# Patient Record
Sex: FEMALE | Race: BLACK OR AFRICAN AMERICAN | NOT HISPANIC OR LATINO | URBAN - METROPOLITAN AREA
[De-identification: names, ages, dates, MRNs, and addresses within clinical notes are randomized per-mention and may not be internally consistent; named-entity substitution may affect disease eponyms.]

---

## 2020-09-15 ENCOUNTER — OFFICE VISIT (OUTPATIENT)
Dept: URBAN - METROPOLITAN AREA CLINIC 13 | Facility: CLINIC | Age: 50
End: 2020-09-15

## 2020-09-15 PROBLEM — 38341003 HYPERTENSION: Status: ACTIVE | Noted: 2020-09-15

## 2020-09-17 ENCOUNTER — OFFICE VISIT (OUTPATIENT)
Dept: URBAN - METROPOLITAN AREA CLINIC 13 | Facility: CLINIC | Age: 50
End: 2020-09-17

## 2020-10-20 ENCOUNTER — OFFICE VISIT (OUTPATIENT)
Dept: URBAN - METROPOLITAN AREA CLINIC 44 | Facility: CLINIC | Age: 50
End: 2020-10-20

## 2020-11-10 ENCOUNTER — OFFICE VISIT (OUTPATIENT)
Dept: URBAN - METROPOLITAN AREA CLINIC 44 | Facility: CLINIC | Age: 50
End: 2020-11-10

## 2020-12-08 ENCOUNTER — OFFICE VISIT (OUTPATIENT)
Dept: URBAN - METROPOLITAN AREA CLINIC 44 | Facility: CLINIC | Age: 50
End: 2020-12-08

## 2021-08-28 ENCOUNTER — TELEPHONE ENCOUNTER (OUTPATIENT)
Dept: URBAN - METROPOLITAN AREA CLINIC 13 | Facility: CLINIC | Age: 51
End: 2021-08-28

## 2021-08-29 ENCOUNTER — TELEPHONE ENCOUNTER (OUTPATIENT)
Dept: URBAN - METROPOLITAN AREA CLINIC 13 | Facility: CLINIC | Age: 51
End: 2021-08-29

## 2021-08-29 RX ORDER — TIZANIDINE 4 MG/1
TABLET ORAL
Status: ACTIVE | COMMUNITY

## 2021-08-29 RX ORDER — FLUTICASONE PROPIONATE 50 UG/1
SPRAY, METERED NASAL
Status: ACTIVE | COMMUNITY

## 2021-08-29 RX ORDER — CLOPIDOGREL 75 MG/1
TABLET ORAL
Status: ACTIVE | COMMUNITY

## 2021-08-29 RX ORDER — ELECTROLYTES/DEXTROSE
SOLUTION, ORAL ORAL
Status: ACTIVE | COMMUNITY

## 2021-08-29 RX ORDER — CHROMIUM 200 MCG
TABLET ORAL
Status: ACTIVE | COMMUNITY

## 2021-08-29 RX ORDER — ATORVASTATIN CALCIUM 40 MG/1
TABLET ORAL
Status: ACTIVE | COMMUNITY

## 2025-07-18 ENCOUNTER — OFFICE VISIT (OUTPATIENT)
Dept: URBAN - METROPOLITAN AREA CLINIC 29 | Facility: CLINIC | Age: 55
End: 2025-07-18
Payer: COMMERCIAL

## 2025-07-18 ENCOUNTER — DASHBOARD ENCOUNTERS (OUTPATIENT)
Age: 55
End: 2025-07-18

## 2025-07-18 DIAGNOSIS — R11.0 CHRONIC NAUSEA: ICD-10-CM

## 2025-07-18 DIAGNOSIS — Z83.719 FAMILY HISTORY OF COLONIC POLYPS: ICD-10-CM

## 2025-07-18 DIAGNOSIS — K76.0 FATTY LIVER: ICD-10-CM

## 2025-07-18 DIAGNOSIS — R14.0 ABDOMINAL BLOATING: ICD-10-CM

## 2025-07-18 DIAGNOSIS — Z79.01 HX OF LONG TERM USE OF BLOOD THINNERS: ICD-10-CM

## 2025-07-18 DIAGNOSIS — I10 ESSENTIAL HYPERTENSION: ICD-10-CM

## 2025-07-18 PROCEDURE — 99204 OFFICE O/P NEW MOD 45 MIN: CPT | Performed by: INTERNAL MEDICINE

## 2025-07-18 RX ORDER — ATORVASTATIN CALCIUM 40 MG/1
TABLET ORAL
Status: ACTIVE | COMMUNITY

## 2025-07-18 RX ORDER — ONDANSETRON 4 MG/1
1 TABLET ON THE TONGUE AND ALLOW TO DISSOLVE TABLET, ORALLY DISINTEGRATING ORAL TWICE A DAY
Qty: 30 | Refills: 3 | OUTPATIENT
Start: 2025-07-20

## 2025-07-18 RX ORDER — ELECTROLYTES/DEXTROSE
SOLUTION, ORAL ORAL
Status: ACTIVE | COMMUNITY

## 2025-07-18 RX ORDER — CHROMIUM 200 MCG
TABLET ORAL
Status: ACTIVE | COMMUNITY

## 2025-07-18 RX ORDER — TIZANIDINE 4 MG/1
TABLET ORAL
Status: ACTIVE | COMMUNITY

## 2025-07-18 RX ORDER — FLUTICASONE PROPIONATE 50 UG/1
SPRAY, METERED NASAL
Status: ACTIVE | COMMUNITY

## 2025-07-18 RX ORDER — LOSARTAN POTASSIUM AND HYDROCHLOROTHIAZIDE 100; 25 MG/1; MG/1
TABLET, FILM COATED ORAL
Status: ACTIVE | COMMUNITY

## 2025-07-18 RX ORDER — METRONIDAZOLE 500 MG/1
1 TABLET TABLET ORAL THREE TIMES A DAY
Qty: 21 TABLET | OUTPATIENT
Start: 2025-07-20 | End: 2025-07-27

## 2025-07-18 RX ORDER — CLOPIDOGREL 75 MG/1
TABLET ORAL
Status: ACTIVE | COMMUNITY

## 2025-07-18 NOTE — HPI-TODAY'S VISIT:
Mrs. Muhammad is a 55-year-old -American female who presents today per referral by Dr. Ying Fontanez for evaluation of some abdominal issues.  She has been having significant nausea with associated bloating and generalized abdominal pain for several months.  However, she reports having normal bowel movements.  She also has a family history of colon polyps and her last colonoscopy was more than 5 years ago.  Given her recent evaluation of her symptoms her ultrasound revealed fatty liver as well as gallstones.  Otherwise, she has no other GI complaints.  Her medical history is significant for hypertension, hyperlipidemia, TIA on Plavix, and morbid obesity.

## 2025-07-20 ENCOUNTER — LAB OUTSIDE AN ENCOUNTER (OUTPATIENT)
Dept: URBAN - METROPOLITAN AREA CLINIC 29 | Facility: CLINIC | Age: 55
End: 2025-07-20

## 2025-07-20 PROBLEM — 197321007: Status: ACTIVE | Noted: 2025-07-20

## 2025-07-20 PROBLEM — 116289008: Status: ACTIVE | Noted: 2025-07-20

## 2025-07-20 PROBLEM — 422587007: Status: ACTIVE | Noted: 2025-07-20

## 2025-07-20 PROBLEM — 161647008: Status: ACTIVE | Noted: 2025-07-20

## 2025-07-20 PROBLEM — 59621000: Status: ACTIVE | Noted: 2025-07-20

## 2025-07-20 PROBLEM — 429969003: Status: ACTIVE | Noted: 2025-07-20

## 2025-07-24 ENCOUNTER — TELEPHONE ENCOUNTER (OUTPATIENT)
Dept: URBAN - METROPOLITAN AREA CLINIC 27 | Facility: CLINIC | Age: 55
End: 2025-07-24

## 2025-08-01 ENCOUNTER — OFFICE VISIT (OUTPATIENT)
Dept: URBAN - METROPOLITAN AREA MEDICAL CENTER 17 | Facility: MEDICAL CENTER | Age: 55
End: 2025-08-01
Payer: COMMERCIAL

## 2025-08-01 DIAGNOSIS — R11.0 AM NAUSEA: ICD-10-CM

## 2025-08-01 PROCEDURE — 43235 EGD DIAGNOSTIC BRUSH WASH: CPT | Performed by: INTERNAL MEDICINE

## 2025-08-01 RX ORDER — ONDANSETRON 4 MG/1
1 TABLET ON THE TONGUE AND ALLOW TO DISSOLVE TABLET, ORALLY DISINTEGRATING ORAL TWICE A DAY
Qty: 30 | Refills: 3 | Status: ACTIVE | COMMUNITY
Start: 2025-07-20

## 2025-08-01 RX ORDER — ELECTROLYTES/DEXTROSE
SOLUTION, ORAL ORAL
Status: ACTIVE | COMMUNITY

## 2025-08-01 RX ORDER — TIZANIDINE 4 MG/1
TABLET ORAL
Status: ACTIVE | COMMUNITY

## 2025-08-01 RX ORDER — FLUTICASONE PROPIONATE 50 UG/1
SPRAY, METERED NASAL
Status: ACTIVE | COMMUNITY

## 2025-08-01 RX ORDER — CLOPIDOGREL 75 MG/1
TABLET ORAL
Status: ACTIVE | COMMUNITY

## 2025-08-01 RX ORDER — CHROMIUM 200 MCG
TABLET ORAL
Status: ACTIVE | COMMUNITY

## 2025-08-01 RX ORDER — LOSARTAN POTASSIUM AND HYDROCHLOROTHIAZIDE 100; 25 MG/1; MG/1
TABLET, FILM COATED ORAL
Status: ACTIVE | COMMUNITY

## 2025-08-01 RX ORDER — ATORVASTATIN CALCIUM 40 MG/1
TABLET ORAL
Status: ACTIVE | COMMUNITY

## 2025-08-22 ENCOUNTER — OFFICE VISIT (OUTPATIENT)
Dept: URBAN - METROPOLITAN AREA MEDICAL CENTER 17 | Facility: MEDICAL CENTER | Age: 55
End: 2025-08-22

## 2025-08-22 RX ORDER — ELECTROLYTES/DEXTROSE
SOLUTION, ORAL ORAL
Status: ACTIVE | COMMUNITY

## 2025-08-22 RX ORDER — ONDANSETRON 4 MG/1
1 TABLET ON THE TONGUE AND ALLOW TO DISSOLVE TABLET, ORALLY DISINTEGRATING ORAL TWICE A DAY
Qty: 30 | Refills: 3 | Status: ACTIVE | COMMUNITY
Start: 2025-07-20

## 2025-08-22 RX ORDER — CLOPIDOGREL 75 MG/1
TABLET ORAL
Status: ACTIVE | COMMUNITY

## 2025-08-22 RX ORDER — LOSARTAN POTASSIUM AND HYDROCHLOROTHIAZIDE 100; 25 MG/1; MG/1
TABLET, FILM COATED ORAL
Status: ACTIVE | COMMUNITY

## 2025-08-22 RX ORDER — FLUTICASONE PROPIONATE 50 UG/1
SPRAY, METERED NASAL
Status: ACTIVE | COMMUNITY

## 2025-08-22 RX ORDER — CHROMIUM 200 MCG
TABLET ORAL
Status: ACTIVE | COMMUNITY

## 2025-08-22 RX ORDER — ATORVASTATIN CALCIUM 40 MG/1
TABLET ORAL
Status: ACTIVE | COMMUNITY

## 2025-08-22 RX ORDER — TIZANIDINE 4 MG/1
TABLET ORAL
Status: ACTIVE | COMMUNITY

## 2025-08-29 ENCOUNTER — OFFICE VISIT (OUTPATIENT)
Dept: URBAN - METROPOLITAN AREA CLINIC 29 | Facility: CLINIC | Age: 55
End: 2025-08-29
Payer: COMMERCIAL

## 2025-08-29 ENCOUNTER — TELEPHONE ENCOUNTER (OUTPATIENT)
Dept: URBAN - METROPOLITAN AREA CLINIC 27 | Facility: CLINIC | Age: 55
End: 2025-08-29

## 2025-08-29 DIAGNOSIS — R11.0 CHRONIC NAUSEA: ICD-10-CM

## 2025-08-29 PROCEDURE — 99213 OFFICE O/P EST LOW 20 MIN: CPT | Performed by: INTERNAL MEDICINE

## 2025-08-29 RX ORDER — TIZANIDINE 4 MG/1
TABLET ORAL
Status: ACTIVE | COMMUNITY

## 2025-08-29 RX ORDER — LOSARTAN POTASSIUM AND HYDROCHLOROTHIAZIDE 100; 25 MG/1; MG/1
TABLET, FILM COATED ORAL
Status: ACTIVE | COMMUNITY

## 2025-08-29 RX ORDER — ATORVASTATIN CALCIUM 40 MG/1
TABLET ORAL
Status: ACTIVE | COMMUNITY

## 2025-08-29 RX ORDER — ELECTROLYTES/DEXTROSE
SOLUTION, ORAL ORAL
Status: ACTIVE | COMMUNITY

## 2025-08-29 RX ORDER — CHROMIUM 200 MCG
TABLET ORAL
Status: ACTIVE | COMMUNITY

## 2025-08-29 RX ORDER — CLOPIDOGREL 75 MG/1
TABLET ORAL
Status: ACTIVE | COMMUNITY

## 2025-08-29 RX ORDER — FLUTICASONE PROPIONATE 50 UG/1
SPRAY, METERED NASAL
Status: ACTIVE | COMMUNITY

## 2025-08-29 RX ORDER — ONDANSETRON 4 MG/1
1 TABLET ON THE TONGUE AND ALLOW TO DISSOLVE TABLET, ORALLY DISINTEGRATING ORAL TWICE A DAY
Qty: 30 | Refills: 3 | Status: ACTIVE | COMMUNITY
Start: 2025-07-20